# Patient Record
Sex: FEMALE | Race: BLACK OR AFRICAN AMERICAN | NOT HISPANIC OR LATINO | URBAN - METROPOLITAN AREA
[De-identification: names, ages, dates, MRNs, and addresses within clinical notes are randomized per-mention and may not be internally consistent; named-entity substitution may affect disease eponyms.]

---

## 2017-03-27 ENCOUNTER — OTHER- (OUTPATIENT)
Dept: URBAN - METROPOLITAN AREA CLINIC 9 | Facility: CLINIC | Age: 37
Setting detail: DERMATOLOGY
End: 2017-03-27

## 2017-03-27 DIAGNOSIS — L57.0 ACTINIC KERATOSIS: ICD-10-CM

## 2017-03-27 PROCEDURE — 99202 OFFICE O/P NEW SF 15 MIN: CPT

## 2017-05-15 ENCOUNTER — OTHER- (OUTPATIENT)
Dept: URBAN - METROPOLITAN AREA CLINIC 9 | Facility: CLINIC | Age: 37
Setting detail: DERMATOLOGY
End: 2017-05-15

## 2017-05-15 DIAGNOSIS — D23.5 OTHER BENIGN NEOPLASM OF SKIN OF TRUNK: ICD-10-CM

## 2017-05-15 DIAGNOSIS — L81.2 FRECKLES: ICD-10-CM

## 2017-05-15 PROCEDURE — 17110 DESTRUCT B9 LESION 1-14: CPT

## 2017-10-11 ENCOUNTER — OTHER- (OUTPATIENT)
Dept: URBAN - METROPOLITAN AREA CLINIC 9 | Facility: CLINIC | Age: 37
Setting detail: DERMATOLOGY
End: 2017-10-11

## 2017-10-11 DIAGNOSIS — L57.0 ACTINIC KERATOSIS: ICD-10-CM

## 2017-10-11 PROCEDURE — 11200 RMVL SKIN TAGS UP TO&INC 15: CPT

## 2017-10-11 PROCEDURE — 11310 SHAVE SKIN LESION 0.5 CM/<: CPT

## 2018-12-28 RX ORDER — FLUOROURACIL 50 MG/G
1 APPLICATION CREAM TOPICAL AS DIRECTED
Qty: 40 | Refills: 1 | Status: DISCONTINUED
Start: 2018-12-28 | End: 2019-03-08

## 2018-12-28 RX ORDER — CALCIPOTRIENE 0.05 MG/G
1 APPLICATION CREAM TOPICAL BID
Qty: 120 | Refills: 1 | Status: DISCONTINUED
Start: 2018-12-28 | End: 2019-03-08

## 2018-12-28 RX ORDER — CLINDAMYCIN PHOSPHATE 10 MG/ML
1 APPLICATION SOLUTION TOPICAL IN A.M.
Qty: 60 | Refills: 3
Start: 2018-12-28

## 2020-03-04 RX ORDER — FINASTERIDE 5 MG/1
ADD'L SIG TABLET TABLET, FILM COATED ORAL ADD'L SIG
Qty: 32 | Refills: 0
Start: 2020-03-04

## 2025-01-31 ENCOUNTER — APPOINTMENT (OUTPATIENT)
Dept: URBAN - METROPOLITAN AREA CLINIC 214 | Age: 45
Setting detail: DERMATOLOGY
End: 2025-02-03

## 2025-01-31 DIAGNOSIS — L91.8 OTHER HYPERTROPHIC DISORDERS OF THE SKIN: ICD-10-CM

## 2025-01-31 PROCEDURE — OTHER COUNSELING: OTHER

## 2025-01-31 PROCEDURE — OTHER SKIN TAG REMOVAL (COSMETIC): OTHER

## 2025-01-31 PROCEDURE — OTHER MIPS QUALITY: OTHER

## 2025-01-31 ASSESSMENT — LOCATION DETAILED DESCRIPTION DERM
LOCATION DETAILED: RIGHT INFERIOR LATERAL NECK
LOCATION DETAILED: LEFT INFERIOR LATERAL NECK
LOCATION DETAILED: RIGHT CLAVICULAR NECK
LOCATION DETAILED: RIGHT CENTRAL ANTERIOR NECK
LOCATION DETAILED: LEFT CLAVICULAR NECK
LOCATION DETAILED: LEFT CENTRAL LATERAL NECK

## 2025-01-31 ASSESSMENT — LOCATION SIMPLE DESCRIPTION DERM
LOCATION SIMPLE: LEFT ANTERIOR NECK
LOCATION SIMPLE: NECK
LOCATION SIMPLE: RIGHT ANTERIOR NECK

## 2025-01-31 ASSESSMENT — LOCATION ZONE DERM: LOCATION ZONE: NECK

## 2025-01-31 NOTE — HPI: EVALUATION OF SKIN LESION(S)
What Type Of Note Output Would You Prefer (Optional)?: Bullet Format
How Severe Are Your Spot(S)?: mild
Have Your Spot(S) Been Treated In The Past?: has not been treated
Hpi Title: Evaluation of Skin Lesions
Additional History: Pt here for cosmetic skin tag removal

## 2025-01-31 NOTE — PROCEDURE: SKIN TAG REMOVAL (COSMETIC)
Price (Use Numbers Only, No Special Characters Or $): 175.00
Consent: Written consent obtained and the risks of skin tag removal was reviewed with the patient including but not limited to bleeding, pigmentary change, infection, pain, and remote possibility of scarring.
Removed With: gradle excision
Detail Level: Detailed
Anesthesia Type: 1% lidocaine with epinephrine
Anesthesia Volume In Cc: 3

## 2025-02-06 ENCOUNTER — APPOINTMENT (OUTPATIENT)
Dept: URBAN - METROPOLITAN AREA CLINIC 214 | Age: 45
Setting detail: DERMATOLOGY
End: 2025-02-06

## 2025-02-06 DIAGNOSIS — L91.8 OTHER HYPERTROPHIC DISORDERS OF THE SKIN: ICD-10-CM

## 2025-02-06 PROCEDURE — OTHER ADDITIONAL NOTES: OTHER

## 2025-02-06 PROCEDURE — OTHER SKIN TAG REMOVAL (COSMETIC): OTHER

## 2025-02-06 ASSESSMENT — LOCATION DETAILED DESCRIPTION DERM: LOCATION DETAILED: LEFT SUPERIOR LATERAL NECK

## 2025-02-06 ASSESSMENT — LOCATION SIMPLE DESCRIPTION DERM: LOCATION SIMPLE: LEFT ANTERIOR NECK

## 2025-02-06 ASSESSMENT — LOCATION ZONE DERM: LOCATION ZONE: NECK

## 2025-02-06 NOTE — PROCEDURE: ADDITIONAL NOTES
Detail Level: Simple
Render Risk Assessment In Note?: no
Additional Notes: NC per DA/ added to last weeks total

## 2025-02-06 NOTE — HPI: SKIN LESION (SKIN TAGS)
How Severe Are They?: mild
Is This A New Presentation, Or A Follow-Up?: Skin Lesion
Additional History: Here for an additional tag to be removed found after visit last week

## 2025-02-06 NOTE — PROCEDURE: SKIN TAG REMOVAL (COSMETIC)
Removed With: gradle excision
Hemostasis: Aluminum Chloride
Price (Use Numbers Only, No Special Characters Or $): 0
Consent: Written consent obtained and the risks of skin tag removal was reviewed with the patient including but not limited to bleeding, pigmentary change, infection, pain, and remote possibility of scarring.
Anesthesia Type: 1% lidocaine with epinephrine
Detail Level: Detailed
Anesthesia Volume In Cc: 3